# Patient Record
Sex: MALE
[De-identification: names, ages, dates, MRNs, and addresses within clinical notes are randomized per-mention and may not be internally consistent; named-entity substitution may affect disease eponyms.]

---

## 2024-04-17 ENCOUNTER — APPOINTMENT (OUTPATIENT)
Dept: UROLOGY | Facility: CLINIC | Age: 59
End: 2024-04-17
Payer: MEDICAID

## 2024-04-17 VITALS
DIASTOLIC BLOOD PRESSURE: 87 MMHG | OXYGEN SATURATION: 97 % | HEIGHT: 71 IN | SYSTOLIC BLOOD PRESSURE: 143 MMHG | WEIGHT: 194 LBS | TEMPERATURE: 98.1 F | HEART RATE: 77 BPM | BODY MASS INDEX: 27.16 KG/M2

## 2024-04-17 DIAGNOSIS — Z78.9 OTHER SPECIFIED HEALTH STATUS: ICD-10-CM

## 2024-04-17 PROBLEM — Z00.00 ENCOUNTER FOR PREVENTIVE HEALTH EXAMINATION: Status: ACTIVE | Noted: 2024-04-17

## 2024-04-17 PROCEDURE — 99214 OFFICE O/P EST MOD 30 MIN: CPT

## 2024-04-18 LAB
ANION GAP SERPL CALC-SCNC: 9 MMOL/L
APPEARANCE: CLEAR
BACTERIA: NEGATIVE /HPF
BILIRUBIN URINE: NEGATIVE
BLOOD URINE: NEGATIVE
BUN SERPL-MCNC: 12 MG/DL
CALCIUM SERPL-MCNC: 8.9 MG/DL
CAST: 1 /LPF
CHLORIDE SERPL-SCNC: 104 MMOL/L
CO2 SERPL-SCNC: 28 MMOL/L
COLOR: YELLOW
CREAT SERPL-MCNC: 1.23 MG/DL
EGFR: 68 ML/MIN/1.73M2
EPITHELIAL CELLS: 0 /HPF
GLUCOSE QUALITATIVE U: NEGATIVE MG/DL
GLUCOSE SERPL-MCNC: 99 MG/DL
KETONES URINE: NEGATIVE MG/DL
LEUKOCYTE ESTERASE URINE: ABNORMAL
MICROSCOPIC-UA: NORMAL
NITRITE URINE: NEGATIVE
PH URINE: 6
POTASSIUM SERPL-SCNC: 4.4 MMOL/L
PROTEIN URINE: NEGATIVE MG/DL
PSA SERPL-MCNC: 7.59 NG/ML
RED BLOOD CELLS URINE: 1 /HPF
SODIUM SERPL-SCNC: 140 MMOL/L
SPECIFIC GRAVITY URINE: 1.01
UROBILINOGEN URINE: 0.2 MG/DL
WHITE BLOOD CELLS URINE: 0 /HPF

## 2024-04-21 LAB — BACTERIA UR CULT: NORMAL

## 2024-04-21 RX ORDER — BRIMONIDINE TARTRATE 2 MG/MG
0.2 SOLUTION/ DROPS OPHTHALMIC
Qty: 15 | Refills: 0 | Status: ACTIVE | COMMUNITY
Start: 2023-10-16

## 2024-04-21 NOTE — ASSESSMENT
[FreeTextEntry1] : The natural history of prostate cancer and ongoing controversy regarding screening and potential treatment outcomes of prostate cancer has been discussed with the patient. The meaning of a false positive PSA and a false negative PSA has been discussed. He indicates understanding of the limitations of this screening test REviewed ptions continue  surveillance , biopsy or mri Risks and benefits reviewed  will get mri to eval target fusion biopsy check ua cx [id number]

## 2024-04-21 NOTE — HISTORY OF PRESENT ILLNESS
[FreeTextEntry1] : Elevated PSA  Patient is here with an elevated PSA. He has no personal history and no family history of prostate cancer.He has no prior genitourinary history of hematuria, hematospermia, prostatitis, UTI, erectile dysfunction, urolithiasis, epididymal orchitis.  No dysuria or hematuria psa 8.4 prior 1.4

## 2024-05-02 ENCOUNTER — OUTPATIENT (OUTPATIENT)
Dept: OUTPATIENT SERVICES | Facility: HOSPITAL | Age: 59
LOS: 1 days | End: 2024-05-02
Payer: MEDICAID

## 2024-05-02 ENCOUNTER — APPOINTMENT (OUTPATIENT)
Dept: MRI IMAGING | Facility: CLINIC | Age: 59
End: 2024-05-02
Payer: MEDICAID

## 2024-05-02 ENCOUNTER — RESULT REVIEW (OUTPATIENT)
Age: 59
End: 2024-05-02

## 2024-05-02 DIAGNOSIS — R97.20 ELEVATED PROSTATE SPECIFIC ANTIGEN [PSA]: ICD-10-CM

## 2024-05-02 PROCEDURE — 76498 UNLISTED MR PROCEDURE: CPT

## 2024-05-02 PROCEDURE — A9585: CPT

## 2024-05-02 PROCEDURE — 72197 MRI PELVIS W/O & W/DYE: CPT | Mod: 26

## 2024-05-02 PROCEDURE — 76498P: CUSTOM | Mod: 26

## 2024-05-02 PROCEDURE — 72197 MRI PELVIS W/O & W/DYE: CPT

## 2024-05-31 ENCOUNTER — APPOINTMENT (OUTPATIENT)
Dept: UROLOGY | Facility: CLINIC | Age: 59
End: 2024-05-31
Payer: MEDICAID

## 2024-05-31 VITALS
WEIGHT: 194 LBS | BODY MASS INDEX: 27.16 KG/M2 | SYSTOLIC BLOOD PRESSURE: 119 MMHG | DIASTOLIC BLOOD PRESSURE: 76 MMHG | HEART RATE: 57 BPM | TEMPERATURE: 97 F | OXYGEN SATURATION: 98 % | HEIGHT: 71 IN

## 2024-05-31 DIAGNOSIS — R97.20 ELEVATED PROSTATE, SPECIFIC ANTIGEN [PSA]: ICD-10-CM

## 2024-05-31 PROCEDURE — 99213 OFFICE O/P EST LOW 20 MIN: CPT

## 2024-06-04 PROBLEM — R97.20 ELEVATED PSA: Status: ACTIVE | Noted: 2024-04-17

## 2024-06-04 NOTE — END OF VISIT
[FreeTextEntry4] : This note was written by Lea Trujillo on 05/31/2024 actively solely Erick House M.D. I, Lea Trujillo, am scribing for and in the presence of Erick House M.D. in the following sections HISTORY OF PRESENT ILLNESS, PAST MEDICAL/FAMILY/SOCIAL HISTORY; REVIEW OF SYSTEMS; VITAL SIGNS; PHYSICAL EXAM; ASSESSMENT/PLAN.   All medical record entries made by this scribe at my, Erick House M.D. direction and personally dictated by me on 05/31/2024. I personally performed the services described in the documentation, reviewed the documentation recorded by the scribe in my presence, and it accurately and completely records my words and actions.

## 2024-06-04 NOTE — ASSESSMENT
[FreeTextEntry1] : 59 year old male with review of MRI results. Reviewed 04/17/24 PSA. 7.59 ng/mL Reviewed 05/02/24 Prostate MRI images with pt. No MRI targetable lesions. Diffuse inflammatory type enhancement throughout the peripheral zone. PIRADS 2 - Low (clinically significant cancer is unlikely to be present). Prostate Volume: 47 mL PSA density: 0.16 ng/mL/mL  Informed pt the MRI revealed PIRADS 2 which means clinically significant cancer is unlikely to be present, but he has an elevated PSA density and prostate inflammation. Discussed either closely following or having a prostate biopsy to evaluate for prostate cancer. Reviewed prostate biopsy procedure and possible side effects. Pt would like to wait and recheck PSA in 3 months to see if still elevated before having prostate biopsy.  PSA ordered today. Pt will have PSA redrawn at a lab in 3 months. RTO in 3 months for follow up visit and review of PSA results.

## 2024-06-04 NOTE — HISTORY OF PRESENT ILLNESS
[FreeTextEntry1] : 04/17/2024 Elevated PSA Patient is here with an elevated PSA. He has no personal history and no family history of prostate cancer. He has no prior genitourinary history of hematuria, hematospermia, prostatitis, UTI, erectile dysfunction, urolithiasis, epididymal orchitis. No dysuria or hematuria psa 8.4 prior 1.4  05/31/2024 cc f/u for Elevated PSA  59 year old male presents for a f/u visit for Elevated PSA.   PSA  1.4 ng/mL 8.4 ng/mL 04/17/24: 7.59 ng/mL  05/02/24 Prostate MRI: No MRI targetable lesions.  Diffuse inflammatory type enhancement throughout the peripheral zone. PIRADS 2 - Low (clinically significant cancer is unlikely to be present)  Prostate Volume: 47 mL PSA density: 0.16 ng/mL/mL  Elevated PSA density. Consider short interval follow-up MRI versus systematic biopsy.

## 2024-09-03 ENCOUNTER — APPOINTMENT (OUTPATIENT)
Dept: UROLOGY | Facility: CLINIC | Age: 59
End: 2024-09-03
Payer: COMMERCIAL

## 2024-09-03 VITALS
SYSTOLIC BLOOD PRESSURE: 128 MMHG | DIASTOLIC BLOOD PRESSURE: 79 MMHG | HEIGHT: 71 IN | BODY MASS INDEX: 27.72 KG/M2 | HEART RATE: 71 BPM | WEIGHT: 198 LBS

## 2024-09-03 DIAGNOSIS — R97.20 ELEVATED PROSTATE, SPECIFIC ANTIGEN [PSA]: ICD-10-CM

## 2024-09-03 PROCEDURE — 99213 OFFICE O/P EST LOW 20 MIN: CPT

## 2024-09-03 NOTE — HISTORY OF PRESENT ILLNESS
[FreeTextEntry1] : 59 year old male presents for a f/u visit for Elevated PSA.  PSA 1.4 ng/mL 8.4 ng/mL 04/17/24: 7.59 ng/mL  05/02/24 Prostate MRI: No MRI targetable lesions.  Diffuse inflammatory type enhancement throughout the peripheral zone. PIRADS 2 - Low (clinically significant cancer is unlikely to be present)  Prostate Volume: 47 mL PSA density: 0.16 ng/mL/mL  Elevated PSA density. Consider short interval follow-up MRI versus systematic biopsy.

## 2024-09-06 LAB — PSA SERPL-MCNC: 7.43 NG/ML

## 2025-02-18 ENCOUNTER — APPOINTMENT (OUTPATIENT)
Dept: UROLOGY | Facility: CLINIC | Age: 60
End: 2025-02-18
Payer: COMMERCIAL

## 2025-02-18 VITALS
WEIGHT: 197 LBS | SYSTOLIC BLOOD PRESSURE: 131 MMHG | HEIGHT: 71 IN | BODY MASS INDEX: 27.58 KG/M2 | TEMPERATURE: 98 F | HEART RATE: 65 BPM | DIASTOLIC BLOOD PRESSURE: 90 MMHG

## 2025-02-18 DIAGNOSIS — R97.20 ELEVATED PROSTATE, SPECIFIC ANTIGEN [PSA]: ICD-10-CM

## 2025-02-18 PROCEDURE — 99213 OFFICE O/P EST LOW 20 MIN: CPT

## 2025-02-18 PROCEDURE — G2211 COMPLEX E/M VISIT ADD ON: CPT

## 2025-02-25 LAB
ANION GAP SERPL CALC-SCNC: 12 MMOL/L
BACTERIA UR CULT: NORMAL
BUN SERPL-MCNC: 13 MG/DL
CALCIUM SERPL-MCNC: 9.2 MG/DL
CHLORIDE SERPL-SCNC: 104 MMOL/L
CO2 SERPL-SCNC: 24 MMOL/L
CREAT SERPL-MCNC: 1.18 MG/DL
EGFR: 71 ML/MIN/1.73M2
GLUCOSE SERPL-MCNC: 95 MG/DL
POTASSIUM SERPL-SCNC: 4.7 MMOL/L
PSA SERPL-MCNC: 5.65 NG/ML
SODIUM SERPL-SCNC: 141 MMOL/L

## 2025-09-02 ENCOUNTER — APPOINTMENT (OUTPATIENT)
Dept: UROLOGY | Facility: CLINIC | Age: 60
End: 2025-09-02